# Patient Record
Sex: MALE | Race: BLACK OR AFRICAN AMERICAN | NOT HISPANIC OR LATINO | Employment: OTHER | URBAN - METROPOLITAN AREA
[De-identification: names, ages, dates, MRNs, and addresses within clinical notes are randomized per-mention and may not be internally consistent; named-entity substitution may affect disease eponyms.]

---

## 2017-01-30 ENCOUNTER — OFFICE VISIT (OUTPATIENT)
Dept: NEUROSURGERY | Facility: CLINIC | Age: 77
End: 2017-01-30
Payer: COMMERCIAL

## 2017-01-30 VITALS
HEIGHT: 73 IN | DIASTOLIC BLOOD PRESSURE: 51 MMHG | TEMPERATURE: 98 F | SYSTOLIC BLOOD PRESSURE: 131 MMHG | WEIGHT: 261.13 LBS | BODY MASS INDEX: 34.61 KG/M2 | HEART RATE: 64 BPM

## 2017-01-30 DIAGNOSIS — M47.816 SPONDYLOSIS OF LUMBAR REGION WITHOUT MYELOPATHY OR RADICULOPATHY: Primary | ICD-10-CM

## 2017-01-30 PROCEDURE — 99213 OFFICE O/P EST LOW 20 MIN: CPT | Mod: PBBFAC | Performed by: NEUROLOGICAL SURGERY

## 2017-01-30 PROCEDURE — 99213 OFFICE O/P EST LOW 20 MIN: CPT | Mod: S$PBB,,, | Performed by: NEUROLOGICAL SURGERY

## 2017-01-30 PROCEDURE — 99499 UNLISTED E&M SERVICE: CPT | Mod: S$PBB,,, | Performed by: NEUROLOGICAL SURGERY

## 2017-01-30 PROCEDURE — 99999 PR PBB SHADOW E&M-EST. PATIENT-LVL III: CPT | Mod: PBBFAC,,, | Performed by: NEUROLOGICAL SURGERY

## 2017-01-30 NOTE — PROGRESS NOTES
Mr. Basilio is a 76-year-old male who is seeing me today in followup.  His last   [facility] appointment was on 12/15/2016.  At that time, he complained   of a longstanding history of low back pain, which has been getting progressively   worse.  He denied any radiating pain.  He states that the pain is worse when he   is walking and with any type of movement, and is better with sitting or lying   down.  He had tried physical therapy, epidural steroid injections and   radiofrequency ablations, all of which brought him minimal relief of his back   pain.  He had an MRI of the lumbar spine available for review, which showed   multilevel lumbar spondylosis, with loss of disk space height, worse at the   L4-L5 level, with Modic changes of the endplates of L4-L5.  There was no   significant central canal narrowing or neural foraminal narrowing.  There was   multilevel facet hypertrophy.  I had sent the patient for a CAT scan of the   lumbar spine as well as flexion, extension x-rays of the lumbar spine.  He is   here today to see me in followup.  Currently, he continues to complain of back   pain, which is getting worse.  This is worse when he is standing and walking,   and better when he is sitting down.  It significantly limits his ability to   walk.  On further questioning today, he does think that the epidural steroid   injections and radiofrequency ablations helped for a couple of months, but then   his pain returned.    NEUROLOGIC EXAMINATION:  He remains intact.  He has 5/5 motor strength   throughout bilateral lower extremities including the iliopsoas, quadriceps,   hamstring, gastrocnemius, tibialis anterior and extensor hallucis longus.    IMAGING:  He has a CAT scan of the lumbar spine available for review, which I   personally reviewed.  This is an outside disk, which I kept.  This shows   multilevel lumbar spondylosis, again worst at the L4-L5 level, with significant   degenerative disk disease and disk  space collapse.  There is moderate facet   hypertrophy at this level.  He has a flexion and extension x-ray of the lumbar   spine, which I personally reviewed.  This is on outside film, which I reviewed   and kept.  This fails to show any abnormal movement in flexion or extension   views.    IMPRESSION AND PLAN:  Mr. Basilio is a 76-year-old male with a longstanding history   of low back pain only as described above.  I do believe that the patient is   likely symptomatic from the L4-L5 level and the significant degenerative disk   disease at that level, but given his significant medical comorbidities as well   as a slight intolerance to any surgical procedure at this time, I believe that   we will try one more round of conservative therapy.  I have given him a referral   to Pain Management for possible epidural steroid injections versus   radiofrequency ablations.  If the patient does not respond to these conservative   therapies, then we can consider surgical intervention at this time.  However,   as I explained to the patient, given the fact that it is back pain only, there   is only about a 60-70% chance of relieving his pain, and he does not wish to   start with surgical intervention given this percentage.  I will see him back in   six months to see how he is doing at that time.  He knows he can call sooner   with any further questions or concerns or if he changes his mind regarding   surgery.      BAKARI  dd: 01/30/2017 14:40:41 (CST)  td: 01/31/2017 03:07:45 (CST)  Doc ID   #[id number]  Job ID #[id number]    CC:       Dictation #: [id number]

## 2018-06-27 ENCOUNTER — PES CALL (OUTPATIENT)
Dept: ADMINISTRATIVE | Facility: CLINIC | Age: 78
End: 2018-06-27

## 2019-07-03 ENCOUNTER — PES CALL (OUTPATIENT)
Dept: ADMINISTRATIVE | Facility: CLINIC | Age: 79
End: 2019-07-03